# Patient Record
Sex: MALE | Race: BLACK OR AFRICAN AMERICAN | NOT HISPANIC OR LATINO | Employment: PART TIME | URBAN - METROPOLITAN AREA
[De-identification: names, ages, dates, MRNs, and addresses within clinical notes are randomized per-mention and may not be internally consistent; named-entity substitution may affect disease eponyms.]

---

## 2023-01-04 ENCOUNTER — HOSPITAL ENCOUNTER (EMERGENCY)
Facility: HOSPITAL | Age: 33
Discharge: HOME | End: 2023-01-04
Attending: EMERGENCY MEDICINE
Payer: MEDICAID

## 2023-01-04 VITALS
WEIGHT: 180 LBS | DIASTOLIC BLOOD PRESSURE: 78 MMHG | SYSTOLIC BLOOD PRESSURE: 142 MMHG | RESPIRATION RATE: 18 BRPM | TEMPERATURE: 98 F | HEART RATE: 52 BPM | OXYGEN SATURATION: 98 % | BODY MASS INDEX: 27.28 KG/M2 | HEIGHT: 68 IN

## 2023-01-04 DIAGNOSIS — M54.31 SCIATICA OF RIGHT SIDE: Primary | ICD-10-CM

## 2023-01-04 PROCEDURE — 99283 EMERGENCY DEPT VISIT LOW MDM: CPT | Mod: ,,, | Performed by: EMERGENCY MEDICINE

## 2023-01-04 PROCEDURE — 99284 EMERGENCY DEPT VISIT MOD MDM: CPT

## 2023-01-04 PROCEDURE — 96372 THER/PROPH/DIAG INJ SC/IM: CPT | Performed by: EMERGENCY MEDICINE

## 2023-01-04 PROCEDURE — 63600175 PHARM REV CODE 636 W HCPCS: Performed by: EMERGENCY MEDICINE

## 2023-01-04 PROCEDURE — 25000003 PHARM REV CODE 250: Performed by: EMERGENCY MEDICINE

## 2023-01-04 PROCEDURE — 99283 PR EMERGENCY DEPT VISIT,LEVEL III: ICD-10-PCS | Mod: ,,, | Performed by: EMERGENCY MEDICINE

## 2023-01-04 RX ORDER — METHOCARBAMOL 500 MG/1
1000 TABLET, FILM COATED ORAL
Status: COMPLETED | OUTPATIENT
Start: 2023-01-04 | End: 2023-01-04

## 2023-01-04 RX ORDER — OXYCODONE HYDROCHLORIDE 5 MG/1
5 TABLET ORAL
Status: COMPLETED | OUTPATIENT
Start: 2023-01-04 | End: 2023-01-04

## 2023-01-04 RX ORDER — OXYCODONE HYDROCHLORIDE 5 MG/1
5 TABLET ORAL EVERY 4 HOURS PRN
Qty: 8 TABLET | Refills: 0 | Status: SHIPPED | OUTPATIENT
Start: 2023-01-04

## 2023-01-04 RX ORDER — ACETAMINOPHEN 325 MG/1
650 TABLET ORAL EVERY 6 HOURS PRN
Qty: 30 TABLET | Refills: 0 | Status: SHIPPED | OUTPATIENT
Start: 2023-01-04

## 2023-01-04 RX ORDER — KETOROLAC TROMETHAMINE 30 MG/ML
15 INJECTION, SOLUTION INTRAMUSCULAR; INTRAVENOUS
Status: COMPLETED | OUTPATIENT
Start: 2023-01-04 | End: 2023-01-04

## 2023-01-04 RX ORDER — NAPROXEN 500 MG/1
500 TABLET ORAL 2 TIMES DAILY WITH MEALS
Qty: 20 TABLET | Refills: 0 | Status: SHIPPED | OUTPATIENT
Start: 2023-01-04 | End: 2023-01-14

## 2023-01-04 RX ORDER — LIDOCAINE 50 MG/G
1 PATCH TOPICAL
Status: DISCONTINUED | OUTPATIENT
Start: 2023-01-04 | End: 2023-01-05 | Stop reason: HOSPADM

## 2023-01-04 RX ORDER — METHOCARBAMOL 500 MG/1
1000 TABLET, FILM COATED ORAL 3 TIMES DAILY PRN
Qty: 30 TABLET | Refills: 0 | Status: SHIPPED | OUTPATIENT
Start: 2023-01-04 | End: 2023-01-09

## 2023-01-04 RX ORDER — MORPHINE SULFATE 2 MG/ML
6 INJECTION, SOLUTION INTRAMUSCULAR; INTRAVENOUS
Status: COMPLETED | OUTPATIENT
Start: 2023-01-04 | End: 2023-01-04

## 2023-01-04 RX ADMIN — MORPHINE SULFATE 6 MG: 2 INJECTION, SOLUTION INTRAMUSCULAR; INTRAVENOUS at 08:01

## 2023-01-04 RX ADMIN — OXYCODONE 5 MG: 5 TABLET ORAL at 10:01

## 2023-01-04 RX ADMIN — KETOROLAC TROMETHAMINE 15 MG: 30 INJECTION, SOLUTION INTRAMUSCULAR; INTRAVENOUS at 06:01

## 2023-01-04 RX ADMIN — LIDOCAINE 1 PATCH: 50 PATCH CUTANEOUS at 06:01

## 2023-01-04 RX ADMIN — METHOCARBAMOL 1000 MG: 500 TABLET ORAL at 06:01

## 2023-01-04 RX ADMIN — OXYCODONE 5 MG: 5 TABLET ORAL at 06:01

## 2023-01-04 NOTE — ED NOTES
Patient arrives via EMS, states pain to lower back, states he is unable to stand up, request joselo medication, pain onset immed PTA, deneis injury.Tylenol PTA

## 2023-01-04 NOTE — ED NOTES
Patient identifiers verified and correct for  Mr Kerns  C/C:  Back pain SEE NN  APPEARANCE: awake and alert in NAD. PAIN  10/10  SKIN: warm, dry and intact. No breakdown or bruising.  MUSCULOSKELETAL: Patient moving all extremities spontaneously, no obvious swelling or deformities noted. Ambulates independently.  RESPIRATORY: Denies shortness of breath.Respirations unlabored.   CARDIAC: Denies CP, 2+ distal pulses; no peripheral edema  ABDOMEN: S/ND/NT, Denies nausea  : voids spontaneously, denies difficulty  Neurologic: AAO x 4; follows commands equal strength in all extremities; denies numbness/tingling. Denies dizziness  denies new weakness, reports mid back pain

## 2023-01-04 NOTE — ED PROVIDER NOTES
Encounter Date: 1/4/2023    SCRIBE #1 NOTE: I, Miranda Simmons, am scribing for, and in the presence of,  Marcos Avalos MD. I have scribed the following portions of the note - Other sections scribed: HPI.     History     Chief Complaint   Patient presents with    Back Pain     EMS reports patient called 911 from home for lower back pain radiating into right hip- worsens with weight bearing/twisting/bending- denies known injury   Time patient was seen by the provider: 5:48 PM      The patient is a 32 y.o. male with no significant past medical history presents to the ED with a complaint of back pain. The patient states that he has been experiencing lower back pain since 11 am this morning. The patient describes his pain as sharp and reports experiencing a pins and needles sensation in his right foot. He states that his pain intensified when he left the store earlier today,which has been constant since onset. During this time he experienced lightheadedness and lower back pain with radion to the middle of his back and right side. He affirms pulling sensation and discomfort in his right leg, tingling and endorses pain when ambulating. He tried 2000 mg of acetaminophen around 12 pm today, with no improvement. He reports sitting up straight exacerbates his pain but lying down helps to alleviate his pain. No other exacerbating or alleviating factors. Patient denies bilateral leg weakness, radiation of pain down his legs, numbness in groin region, bowel or bladder incontinence, penile pain, testicular pain, hematuria, fever, chest pain, shortness of breath, dizziness or other associated symptoms. Denies IV drug use. Denies experiencing similar symptoms in the past.     The history is provided by the patient and medical records. No  was used.   Review of patient's allergies indicates:  No Known Allergies  History reviewed. No pertinent past medical history.  History reviewed. No pertinent surgical  history.  History reviewed. No pertinent family history.  Social History     Tobacco Use    Smoking status: Never    Smokeless tobacco: Never   Substance Use Topics    Alcohol use: Not Currently    Drug use: No     Review of Systems    Physical Exam     Initial Vitals [01/04/23 1605]   BP Pulse Resp Temp SpO2   (!) 144/98 62 17 98.4 °F (36.9 °C) 100 %      MAP       --         Physical Exam    Nursing note and vitals reviewed.      Physical Exam:  CONSTITUTIONAL: Well developed, well nourished, in no acute distress.  HENT: Normocephalic, atraumatic    EYES: Sclerae anicteric   NECK: Supple, no thyroid enlargement  CARDIOVASCULAR: Regular rate and rhythm without any murmurs, gallops, rubs.  RESPIRATORY: Speaking in full sentences. Breathing comfortably. Auscultation of the lungs revealed normal breath sounds b/l, no wheezing, no rales, no rhonchi.  ABDOMEN: Soft and nontender, no masses, no rebound or guarding   NEUROLOGIC: Alert, interacting normally. No facial droop.  5/5 strength bilateral lower extremities.  Normal sensation to light touch bilateral lower extremities.  Negative straight leg raise bilaterally.  MSK:  There is no midline C, T, L-spine tenderness.  There is discomfort to palpation to the right lower back.  Moving all four extremities.  Skin: Warm and dry. No visible rash on exposed areas of skin.    Psych: Mood and affect normal.     ED Course   Procedures  Labs Reviewed - No data to display       Imaging Results    None          Medications   ketorolac injection 15 mg (15 mg Intramuscular Given 1/4/23 1823)   oxyCODONE immediate release tablet 5 mg (5 mg Oral Given 1/4/23 1822)   methocarbamoL tablet 1,000 mg (1,000 mg Oral Given 1/4/23 1822)   morphine injection 6 mg (6 mg Intramuscular Given 1/4/23 2038)   oxyCODONE immediate release tablet 5 mg (5 mg Oral Given 1/4/23 2203)     Medical Decision Making:   History:   Old Medical Records: I decided to obtain old medical records.     32-year-old  male with past medical history as noted coming in with right-sided back pain that started around 11:00 a.m. today, no trauma.  Does have some tingling in the right leg but no right leg weakness, no saddle anesthesia, no bowel or bladder incontinence.  No IV drug use.  No fevers.  Took Tylenol with minimal relief.    On exam intact strength in the lower extremities, normal neuro exam, no focal back tenderness.    History and exam is consistent with likely back sprain versus lumbar radiculopathy.  Given his lack of risk factors, , red flag symptoms, not consistent with spinal infection, spinal cord compression, dangerous myelopathic or nerve root compression.    Worse with movement, improved with lying still, no flank pain, does not radiate to the groin, not consistent with kidney stone.    No anterior pain, abnormal mental exam is entirely benign, not consistent with dangerous abdominal pathology at this time.    At this time there is no indication for imaging or labs.  Will pain control with Toradol, oxycodone, lidocaine patch, Robaxin.    Anticipate likely discharge home with supportive care, outpatient follow-up if symptoms are improved.    Update:  Patient reexamined after 1st round of ED medications.  He says he is still having significant pain.  Given 6 IM of morphine.    Reexamined after the morphine.  Patient pain is improved still present.  He is able to get up and walk with no difficulty.  Patient is concerned about the pain at home.  I explained that we will prescribe him pain medications, including short courses of Robaxin and oxycodone, at home and this condition can be quite painful.  He has for 1 more dose of pain medications before he goes home until he can fill his prescriptions.  5 mg of oxycodone given.    I would a long discussion with the patient and his mother.  Given his presentation, lack of red flag symptoms and risk factors, this time there is no indication for labs or imaging in the  emergency department.  Not clinically consistent with dangerous spinal cord compression, myelopathic or dangerous nerve root compression, spinal infection, kidney or abdominal pathology at this time.  He remains hemodynamically stable and well-appearing in the emergency department with benign vitals.    Outpatient follow-up with his primary care doctor.  ED return precautions for any fevers, weakness or numbness, bowel or bladder incontinence, uncontrolled pain, falls, or any other new, worsening worrisome symptoms.    Findings of ED work up explained to patient and mother. Patient and mother agrees with discharge plan and verbalizes understanding of return precautions.            Scribe Attestation:   Scribe #1: I performed the above scribed service and the documentation accurately describes the services I performed. I attest to the accuracy of the note.    Attending Attestation:           Physician Attestation for Scribe:  Physician Attestation Statement for Scribe #1: I, reviewed documentation, as scribed by Miranda Simmons in my presence, and it is both accurate and complete.                        Clinical Impression:   Final diagnoses:  [M54.31] Sciatica of right side (Primary)        ED Disposition Condition    Discharge Stable          ED Prescriptions       Medication Sig Dispense Start Date End Date Auth. Provider    naproxen (NAPROSYN) 500 MG tablet Take 1 tablet (500 mg total) by mouth 2 (two) times daily with meals. for 10 days 20 tablet 1/4/2023 1/14/2023 Marcos Avalos MD    acetaminophen (TYLENOL) 325 MG tablet Take 2 tablets (650 mg total) by mouth every 6 (six) hours as needed for Pain. 30 tablet 1/4/2023 -- Marcos Avalos MD    methocarbamoL (ROBAXIN) 500 MG Tab Take 2 tablets (1,000 mg total) by mouth 3 (three) times daily as needed. Do not drive or operate heavy machinery while taking this medication. 30 tablet 1/4/2023 1/9/2023 Marcos Avalos MD    oxyCODONE (ROXICODONE) 5 MG immediate  release tablet Take 1 tablet (5 mg total) by mouth every 4 (four) hours as needed for Pain. 8 tablet 1/4/2023 -- Marcos Avalos MD          Follow-up Information       Follow up With Specialties Details Why Contact Info    [facility]  In 1 week  [address]  [facility] [address]  [phone]               Marcos Avalos MD  01/06/23 7603

## 2023-01-05 NOTE — DISCHARGE INSTRUCTIONS
Your history of illness and physical exam did not show any signs of dangerous medical conditions.  It is likely that you have sciatica which is quite painful.    Please take Tylenol and naproxen as prescribed for pain control.  You can also take Robaxin as well as oxycodone for breakthrough pain.    Please rest, attempt to stay active however to not undertake strenuous activity.    Follow-up with your primary care doctor within 1 week.    If you develop any numbness, weakness, uncontrolled pain, trouble urinating, fever, or any other new, worsening worrisome symptoms please return to the emergency department for re-evaluation.